# Patient Record
Sex: FEMALE | Race: BLACK OR AFRICAN AMERICAN | NOT HISPANIC OR LATINO | URBAN - METROPOLITAN AREA
[De-identification: names, ages, dates, MRNs, and addresses within clinical notes are randomized per-mention and may not be internally consistent; named-entity substitution may affect disease eponyms.]

---

## 2022-09-07 ENCOUNTER — INPATIENT (INPATIENT)
Facility: HOSPITAL | Age: 42
LOS: 0 days | Discharge: HOME | End: 2022-09-08
Attending: OBSTETRICS & GYNECOLOGY | Admitting: OBSTETRICS & GYNECOLOGY

## 2022-09-07 ENCOUNTER — RESULT REVIEW (OUTPATIENT)
Age: 42
End: 2022-09-07

## 2022-09-07 ENCOUNTER — TRANSCRIPTION ENCOUNTER (OUTPATIENT)
Age: 42
End: 2022-09-07

## 2022-09-07 VITALS
OXYGEN SATURATION: 100 % | TEMPERATURE: 98 F | RESPIRATION RATE: 16 BRPM | HEART RATE: 87 BPM | DIASTOLIC BLOOD PRESSURE: 82 MMHG | SYSTOLIC BLOOD PRESSURE: 116 MMHG

## 2022-09-07 DIAGNOSIS — O03.4 INCOMPLETE SPONTANEOUS ABORTION WITHOUT COMPLICATION: ICD-10-CM

## 2022-09-07 LAB
ALBUMIN SERPL ELPH-MCNC: 3.9 G/DL — SIGNIFICANT CHANGE UP (ref 3.3–5)
ALP SERPL-CCNC: 83 U/L — SIGNIFICANT CHANGE UP (ref 40–120)
ALT FLD-CCNC: 12 U/L — SIGNIFICANT CHANGE UP (ref 4–33)
ANION GAP SERPL CALC-SCNC: 10 MMOL/L — SIGNIFICANT CHANGE UP (ref 7–14)
APTT BLD: 31.4 SEC — SIGNIFICANT CHANGE UP (ref 27–36.3)
AST SERPL-CCNC: 15 U/L — SIGNIFICANT CHANGE UP (ref 4–32)
BASOPHILS # BLD AUTO: 0.04 K/UL — SIGNIFICANT CHANGE UP (ref 0–0.2)
BASOPHILS NFR BLD AUTO: 0.4 % — SIGNIFICANT CHANGE UP (ref 0–2)
BILIRUB SERPL-MCNC: <0.2 MG/DL — SIGNIFICANT CHANGE UP (ref 0.2–1.2)
BLD GP AB SCN SERPL QL: NEGATIVE — SIGNIFICANT CHANGE UP
BUN SERPL-MCNC: 12 MG/DL — SIGNIFICANT CHANGE UP (ref 7–23)
CALCIUM SERPL-MCNC: 9.1 MG/DL — SIGNIFICANT CHANGE UP (ref 8.4–10.5)
CHLORIDE SERPL-SCNC: 104 MMOL/L — SIGNIFICANT CHANGE UP (ref 98–107)
CO2 SERPL-SCNC: 25 MMOL/L — SIGNIFICANT CHANGE UP (ref 22–31)
CREAT SERPL-MCNC: 1 MG/DL — SIGNIFICANT CHANGE UP (ref 0.5–1.3)
EGFR: 73 ML/MIN/1.73M2 — SIGNIFICANT CHANGE UP
EOSINOPHIL # BLD AUTO: 0.2 K/UL — SIGNIFICANT CHANGE UP (ref 0–0.5)
EOSINOPHIL NFR BLD AUTO: 2 % — SIGNIFICANT CHANGE UP (ref 0–6)
GLUCOSE SERPL-MCNC: 99 MG/DL — SIGNIFICANT CHANGE UP (ref 70–99)
HCG SERPL-ACNC: 1199 MIU/ML — SIGNIFICANT CHANGE UP
HCT VFR BLD CALC: 30.2 % — LOW (ref 34.5–45)
HCT VFR BLD CALC: 30.8 % — LOW (ref 34.5–45)
HGB BLD-MCNC: 9.5 G/DL — LOW (ref 11.5–15.5)
HGB BLD-MCNC: 9.6 G/DL — LOW (ref 11.5–15.5)
IANC: 6.44 K/UL — SIGNIFICANT CHANGE UP (ref 1.8–7.4)
IMM GRANULOCYTES NFR BLD AUTO: 0.4 % — SIGNIFICANT CHANGE UP (ref 0–1.5)
INR BLD: 1.06 RATIO — SIGNIFICANT CHANGE UP (ref 0.88–1.16)
LYMPHOCYTES # BLD AUTO: 2.68 K/UL — SIGNIFICANT CHANGE UP (ref 1–3.3)
LYMPHOCYTES # BLD AUTO: 27.2 % — SIGNIFICANT CHANGE UP (ref 13–44)
MAGNESIUM SERPL-MCNC: 2 MG/DL — SIGNIFICANT CHANGE UP (ref 1.6–2.6)
MCHC RBC-ENTMCNC: 24.8 PG — LOW (ref 27–34)
MCHC RBC-ENTMCNC: 25.3 PG — LOW (ref 27–34)
MCHC RBC-ENTMCNC: 31.2 GM/DL — LOW (ref 32–36)
MCHC RBC-ENTMCNC: 31.5 GM/DL — LOW (ref 32–36)
MCV RBC AUTO: 79.6 FL — LOW (ref 80–100)
MCV RBC AUTO: 80.5 FL — SIGNIFICANT CHANGE UP (ref 80–100)
MONOCYTES # BLD AUTO: 0.46 K/UL — SIGNIFICANT CHANGE UP (ref 0–0.9)
MONOCYTES NFR BLD AUTO: 4.7 % — SIGNIFICANT CHANGE UP (ref 2–14)
NEUTROPHILS # BLD AUTO: 6.44 K/UL — SIGNIFICANT CHANGE UP (ref 1.8–7.4)
NEUTROPHILS NFR BLD AUTO: 65.3 % — SIGNIFICANT CHANGE UP (ref 43–77)
NRBC # BLD: 0 /100 WBCS — SIGNIFICANT CHANGE UP (ref 0–0)
NRBC # BLD: 0 /100 WBCS — SIGNIFICANT CHANGE UP (ref 0–0)
NRBC # FLD: 0 K/UL — SIGNIFICANT CHANGE UP (ref 0–0)
NRBC # FLD: 0 K/UL — SIGNIFICANT CHANGE UP (ref 0–0)
PLATELET # BLD AUTO: 437 K/UL — HIGH (ref 150–400)
PLATELET # BLD AUTO: 448 K/UL — HIGH (ref 150–400)
POTASSIUM SERPL-MCNC: 4.4 MMOL/L — SIGNIFICANT CHANGE UP (ref 3.5–5.3)
POTASSIUM SERPL-SCNC: 4.4 MMOL/L — SIGNIFICANT CHANGE UP (ref 3.5–5.3)
PROT SERPL-MCNC: 6.7 G/DL — SIGNIFICANT CHANGE UP (ref 6–8.3)
PROTHROM AB SERPL-ACNC: 12.3 SEC — SIGNIFICANT CHANGE UP (ref 10.5–13.4)
RBC # BLD: 3.75 M/UL — LOW (ref 3.8–5.2)
RBC # BLD: 3.87 M/UL — SIGNIFICANT CHANGE UP (ref 3.8–5.2)
RBC # FLD: 15.7 % — HIGH (ref 10.3–14.5)
RBC # FLD: 15.7 % — HIGH (ref 10.3–14.5)
RH IG SCN BLD-IMP: POSITIVE — SIGNIFICANT CHANGE UP
RH IG SCN BLD-IMP: POSITIVE — SIGNIFICANT CHANGE UP
SARS-COV-2 RNA SPEC QL NAA+PROBE: SIGNIFICANT CHANGE UP
SODIUM SERPL-SCNC: 139 MMOL/L — SIGNIFICANT CHANGE UP (ref 135–145)
WBC # BLD: 10.75 K/UL — HIGH (ref 3.8–10.5)
WBC # BLD: 9.86 K/UL — SIGNIFICANT CHANGE UP (ref 3.8–10.5)
WBC # FLD AUTO: 10.75 K/UL — HIGH (ref 3.8–10.5)
WBC # FLD AUTO: 9.86 K/UL — SIGNIFICANT CHANGE UP (ref 3.8–10.5)

## 2022-09-07 PROCEDURE — 99285 EMERGENCY DEPT VISIT HI MDM: CPT

## 2022-09-07 PROCEDURE — 76830 TRANSVAGINAL US NON-OB: CPT | Mod: 26

## 2022-09-07 RX ORDER — SODIUM CHLORIDE 9 MG/ML
1000 INJECTION, SOLUTION INTRAVENOUS
Refills: 0 | Status: DISCONTINUED | OUTPATIENT
Start: 2022-09-07 | End: 2022-09-08

## 2022-09-07 RX ADMIN — SODIUM CHLORIDE 125 MILLILITER(S): 9 INJECTION, SOLUTION INTRAVENOUS at 23:46

## 2022-09-07 NOTE — ED PROVIDER NOTE - CLINICAL SUMMARY MEDICAL DECISION MAKING FREE TEXT BOX
41 F  no PMH p/w worsening vaginal bleeding today (>15 pads) following >1 month of daily vaginal bleeding s/p miscarriage 3 weeks ago. Likely retained products of conception. TV US, OB cs, labs.

## 2022-09-07 NOTE — ED ADULT NURSE NOTE - OBJECTIVE STATEMENT
Received pt to [facility], A+Ox4, ambulatory. C/O worsening Vaginal bleeding over the last 3 days, , pt states she miscarried mid August, has bleeding ever since with clots, bleeding has gotten worse. Respirations even and unlabored, normal work of breathing, no accessory muscle use, speaking in full clear uninterrupted sentences. ABD is soft, tender in the lower quadrants, non distended.  Pt denies any chest pain, SOB, headache, dizziness, N/V/D, fever, chills. 18Gto LAC, Labs sent, will continue to monitor.

## 2022-09-07 NOTE — CONSULT NOTE ADULT - SUBJECTIVE AND OBJECTIVE BOX
ARMANI MCCABE  41y  Female [id number]    HPI:  40 y/o  LMP  presenting to ED with heavy vaginal bleeding for the past month.    States that she initially found out she was pregnant on  with a positive urine pregnancy test. Starting  she started to have very heavy bleeding, sometimes saturating through 10 pads a day, sometimes 3-5     Name of GYN Physician:     ObHx:  GynHx: Denies fibroids, cysts, endometriosis, STI's, Abnormal pap smears   PMHx:  SurgHx:  Meds:  Allergies:  Social History:  Denies smoking use, drug use, alcohol use.    Vital Signs Last 24 Hrs  T(C): 36.9 (07 Sep 2022 19:58), Max: 36.9 (07 Sep 2022 19:58)  T(F): 98.4 (07 Sep 2022 19:58), Max: 98.4 (07 Sep 2022 19:58)  HR: 89 (07 Sep 2022 19:58) (87 - 89)  BP: 106/81 (07 Sep 2022 19:58) (106/81 - 116/82)  BP(mean): --  RR: 18 (07 Sep 2022 19:58) (16 - 18)  SpO2: 100% (07 Sep 2022 19:58) (100% - 100%)    Parameters below as of 07 Sep 2022 19:58  Patient On (Oxygen Delivery Method): room air        Physical Exam:   General: sitting comfortably in bed, NAD   HEENT: neck supple, full ROM  CV: RR S1S2 no m/r/g  Lungs: CTA b/l, good air flow b/l   Back: No CVA tenderness  Abd: Soft, non-tender, non-distended.  Bowel sounds present.    :  No bleeding on pad. External labia wnl. Bimanual exam with no cervical motion tenderness, uterus wnl, adnexa non palpable b/l.  Cervix closed vs. Cervix dilated *** cm   Speculum Exam: No active bleeding from os. Physiologic discharge.    Ext: non-tender b/l, no edema     Chaperoned by     LABS:                              9.5    9.86  )-----------( 448      ( 07 Sep 2022 17:00 )             30.2     -    139  |  104  |  12  ----------------------------<  99  4.4   |  25  |  1.00    Ca    9.1      07 Sep 2022 17:00  Mg     2.00         TPro  6.7  /  Alb  3.9  /  TBili  <0.2  /  DBili  x   /  AST  15  /  ALT  12  /  AlkPhos  83      I&O's Detail    PT/INR - ( 07 Sep 2022 17:00 )   PT: 12.3 sec;   INR: 1.06 ratio         PTT - ( 07 Sep 2022 17:00 )  PTT:31.4 sec      RADIOLOGY & ADDITIONAL STUDIES: ARMANI MCCABE  41y  Female [id number]    HPI:  40 y/o  LMP  presenting to ED with heavy vaginal bleeding for the past month following known miscarriage.    States that she initially found out she was pregnant on  with a positive urine pregnancy test. Starting  she started to have very heavy bleeding, sometimes saturating through 10 pads a day, sometimes 3-5 on a lighter day. Also notes passage of large, fist-size clots multiple times a day. Endorses lightheadedness and dizziness since the start of the bleeding. Denies fevers, chills, abdominal pain, abnormal vaginal discharge. States that she presented to the [facility] ED a few times with the same complaint - initially was told she was having bleeding in pregnancy, then was told she was having a miscarriage and that they did not see anything in her uterus. Per EMR chart review on patient's phone, seen on  with an H/H of 11.7/35.9 and a [place] HCG of 632[address], FH noted at that time. Seen again  with H/H of 10.8/33.4 with HCG of 5338 and diagnosed with miscarriage on ultrasound. Saw her doctor today (Dr Aparicio?) who told her to come to the emergency department given her symptoms and bleeding.    At time of evaluation, patient endorsing lightheadedness and dizziness, moving comfortably in bed. Stating that she felt weak from the bleeding. Denied having any procedures done or any medications taken so far. At bedside, repeated vital signs with BPs in 100s/70s, non-tachycardic.    Name of GYN Physician: Dr Aparicio(?) associated / [facility]    ObHx: NSVDx5 (last )  GynHx: Denies fibroids, cysts, endometriosis, STI's, Abnormal pap smears   PMHx: denies  SurgHx: LSC ovarian cystectomy (, [place])  Meds: denies  Allergies: NKDA  Social History:  Denies smoking use, drug use, alcohol use.    Vital Signs Last 24 Hrs  T(C): 36.9 (07 Sep 2022 19:58), Max: 36.9 (07 Sep 2022 19:58)  T(F): 98.4 (07 Sep 2022 19:58), Max: 98.4 (07 Sep 2022 19:58)  HR: 89 (07 Sep 2022 19:58) (87 - 89)  BP: 106/81 (07 Sep 2022 19:58) (106/81 - 116/82)  BP(mean): --  RR: 18 (07 Sep 2022 19:58) (16 - 18)  SpO2: 100% (07 Sep 2022 19:58) (100% - 100%)    Parameters below as of 07 Sep 2022 19:58  Patient On (Oxygen Delivery Method): room air    Physical Exam:   General: laying comfortably in bed, NAD   CV: RR S1S2 no m/r/g  Lungs: CTA b/l, good air flow b/l   Back: No CVA tenderness  Abd: Soft, non-tender, non-distended. Bowel sounds present.    : Spotting on recently changed pad. External labia wnl. Bimanual exam with no cervical motion tenderness, uterus wnl, adnexa non palpable b/l.  Cervix closed.  Speculum Exam: No active bleeding from os. Small ~5cc dark blood clot in vault, minimal blood in vault. Physiologic discharge.    Ext: non-tender b/l, no edema     Chaperoned by Mayra Dupont    LABS:             9.5    9.86  )-----------( 448      ( 07 Sep 2022 17:00 )             30.2         139  |  104  |  12  ----------------------------<  99  4.4   |  25  |  1.00    Ca    9.1      07 Sep 2022 17:00  Mg     2.00         TPro  6.7  /  Alb  3.9  /  TBili  <0.2  /  DBili  x   /  AST  15  /  ALT  12  /  AlkPhos  83  -07    I&O's Detail    PT/INR - ( 07 Sep 2022 17:00 )   PT: 12.3 sec;   INR: 1.06 ratio         PTT - ( 07 Sep 2022 17:00 )  PTT:31.4 sec    RADIOLOGY & ADDITIONAL STUDIES:  < from: US Transvaginal (22 @ 17:54) >  ACC: [id number] EXAM:  US TRANSVAGINAL                          PROCEDURE DATE:  2022          INTERPRETATION:  CLINICAL INFORMATION: Vaginal bleeding. Assess for   retained products of conception.    LMP: 2022.    COMPARISON: None available.    TECHNIQUE:  Endovaginal pelvic sonogram only.    FINDINGS:  Uterus: 7.1 cm x 4.6 cm x 4.1 cm. Within normal limits.  Endometrium: 13 mm. Thickened with echogenic intraluminal contents and   increased vascularity with prominent vessel along the anterior   endometrium.    Right ovary: 2.6 cm x 1.0 cm x 1.3 cm inclusive of a corpus luteum.  Left ovary: Not visualized.    Bilobed cystic structure in the left adnexa with lobes measuring up to   1.3 and 1.7 cm respectively.    Fluid: None.    IMPRESSION:    Findings concerning for retained products of conception. Correlate with   serial trending beta hCGs.    --- End of Report ---      MARYAM SAPP MD; Resident Radiology  This document has been electronically signed.  SANDRA IBRAHIM MD; Attending Radiologist  This document has been electronically signed. Sep  7 2022  6:21PM    < end of copied text >

## 2022-09-07 NOTE — ED PROVIDER NOTE - PROGRESS NOTE DETAILS
[facility]:  pt agreed to have surgery.  GYN repaged and saw pt.  they will add pt on for OR tonight.  will admit to GYN service

## 2022-09-07 NOTE — ED PROVIDER NOTE - NS ED ROS FT
REVIEW OF SYSTEMS:  CONSTITUTIONAL: + weakness, no fevers or chills  EYES/ENT: No visual changes; No vertigo, throat pain, or dysphagia  NECK: No pain or stiffness  RESPIRATORY: No cough, wheezing, hemoptysis, or shortness of breath  CARDIOVASCULAR: +lightheadedness. No chest pain or palpitations  GASTROINTESTINAL: No abdominal or epigastric pain. No nausea, vomiting, or hematemesis; No diarrhea or constipation. No melena or hematochezia.  GENITOURINARY: +vaginal bleeding. No dysuria, frequency, or hematuria  MUSCULOSKELETAL: No joint or muscle pain or aches  NEUROLOGICAL: No numbness or weakness  SKIN: No itching or rashes

## 2022-09-07 NOTE — ED PROVIDER NOTE - PHYSICAL EXAMINATION
T(C): 36.8 (09-07-22 @ 15:46), Max: 36.8 (09-07-22 @ 15:46)  HR: 87 (09-07-22 @ 15:46) (87 - 87)  BP: 116/82 (09-07-22 @ 15:46) (116/82 - 116/82)  RR: 16 (09-07-22 @ 15:46) (16 - 16)  SpO2: 100% (09-07-22 @ 15:46) (100% - 100%)    CONSTITUTIONAL: Well groomed, no apparent distress  EYES: PERRL and symmetric, No conjunctival or scleral injection, non-icteric  ENMT: Oral mucosa with moist membranes. N  RESP: No respiratory distress, no use of accessory muscles; CTA b/l, no WRR  CV: RRR, +S1S2, no MRG; no JVD; no peripheral edema  GI: TTP in b/l lower quadrants. Soft, ND, no rebound  : +gross vaginal bleeding  SKIN: No rashes or ulcers noted; no subcutaneous nodules or induration palpable

## 2022-09-07 NOTE — ED PROVIDER NOTE - CARE PLAN
Assessment and plan of treatment:	41 F  no PMH p/w worsening vaginal bleeding today (>15 pads) following >1 month of daily vaginal bleeding s/p miscarriage 3 weeks ago. Likely retained products of conception.    Plan:  - Labs: CBC, CMP, HCG, coags, T&S, COVID, Rh labs  - US transvaginal  - OB cs  - Dispo: Poss admit   Principal Discharge DX:	Retained products of conception after miscarriage  Assessment and plan of treatment:	41 F  no PMH p/w worsening vaginal bleeding today (>15 pads) following >1 month of daily vaginal bleeding s/p miscarriage 3 weeks ago. Likely retained products of conception.    Plan:  - Labs: CBC, CMP, HCG, coags, T&S, COVID, Rh labs  - US transvaginal  - OB cs  - Dispo: Poss admit   1

## 2022-09-07 NOTE — ED PROVIDER NOTE - OBJECTIVE STATEMENT
41 F  no PMH p/w worsening vaginal bleeding today. Patient reports that the vaginal bleeding started on . Patient saw OB, Dr. Aparicio at [place], a two days later and pregnancy remained viable 41 F  no PMH p/w worsening vaginal bleeding today. Patient reports that the vaginal bleeding started on . Patient saw OB, Dr. Aparicio at [place], multiple times last month and was found to have a miscarriage. Patient denies any procedures since the . Bleeding has continued daily, but greatly increased today. Patient reports bleeding through about 15 pads since this afternoon. Patient endorses weakness, fatigue, but denies SOB, CP, N/V/D. 41 F  no PMH p/w worsening vaginal bleeding today. Patient reports that the vaginal bleeding started on . Patient saw OB, Dr. Aparicio at [facility], multiple times last month and was found to have a miscarriage. Patient denies any procedures since the . Bleeding has continued daily, but greatly increased today. Patient reports bleeding through about 15 pads since this afternoon. Patient endorses weakness, fatigue, but denies SOB, CP, N/V/D.    Attendinyo female  presents with vaginal bleeding since .  was diagnosed with miscarriage at end of july/early august.  since then has been bleeding daily.  has increased bleeding and weakness today.  no surgical history.  never had this happen before.

## 2022-09-07 NOTE — H&P ADULT - ASSESSMENT
42 y/o  LMP  presenting to ED with heavy vaginal bleeding in the setting of incomplete .    #Incomplete   - TVUS: uterus 7.1x4.6x4.1cm, endometrium 13mm, thickened with echogenic intraluminal contents and increased vascularity with prominent vessel along the anterior endometrium, findings concerning for retained products of conception  - Labs: H/H 9.5/30.2, T&S O+  - On exam, patient without active bleeding, scant bleeding on recently changed pad; however patient reporting passage of large clots while in the ED  - Pt initially declining D&C as documented in prior Gyn consult note  - Notified by ED attending pt was spoken to w/ [employer]  and desires to proceed with the procedure; spoke to patient at bedside to confirm and signed consents  - Added on to OR for urgent D&C  - Admit to Gyn for procedure    d/w service attending Dr Leo Lopez  PGY-2   42 y/o  LMP  presenting to ED with heavy vaginal bleeding in the setting of incomplete .    #Incomplete   - TVUS: uterus 7.1x4.6x4.1cm, endometrium 13mm, thickened with echogenic intraluminal contents and increased vascularity with prominent vessel along the anterior endometrium, findings concerning for retained products of conception  - Labs: H/H 9.5/30.2, T&S O+  - On exam, patient without active bleeding, scant bleeding on recently changed pad; however patient reporting passage of large clots while in the ED  - Pt initially declining D&C as documented in prior Gyn consult note  - Notified by ED attending pt was spoken to w/ [employer] [place] [employer]  and desires to proceed with the procedure; spoke to patient at bedside to confirm and signed consents  - Added on to OR for urgent D&C  - Admit to Gyn for procedure    d/w service attending Dr Leo Lopez  PGY-2    42 y/o  admitted for D&C for presumed retained POC's.  David Bailey M.D.

## 2022-09-07 NOTE — ED ADULT NURSE REASSESSMENT NOTE - NS ED NURSE REASSESS COMMENT FT1
report received from day shift RN. Pt A&Ox4 and ambulatory, c/o dizziness at this time. MD aware. abd is soft and nondistended, nontender upon palpation. pt reports no active bleeding at this time. VS as noted in flowsheet. respirations even and unlabored. skin intact. awaiting rpt lab results.

## 2022-09-07 NOTE — ED ADULT TRIAGE NOTE - CHIEF COMPLAINT QUOTE
Pt had miscarriage 3 weeks ago presents to ED for vaginal bleeding worsening over the past 3 days. Pt endorses dizziness, pt saturated over 15 pads this afternoon. Pt lethargic in triage endorses shortness of breath, denies any present pain.

## 2022-09-07 NOTE — H&P ADULT - HISTORY OF PRESENT ILLNESS
ARMANI MCCABE  41y  Female [id number]    HPI:  42 y/o  LMP  presenting to ED with heavy vaginal bleeding for the past month following known miscarriage.    States that she initially found out she was pregnant on  with a positive urine pregnancy test. Starting  she started to have very heavy bleeding, sometimes saturating through 10 pads a day, sometimes 3-5 on a lighter day. Also notes passage of large, fist-size clots multiple times a day. Endorses lightheadedness and dizziness since the start of the bleeding. Denies fevers, chills, abdominal pain, abnormal vaginal discharge. States that she presented to the [facility] ED a few times with the same complaint - initially was told she was having bleeding in pregnancy, then was told she was having a miscarriage and that they did not see anything in her uterus. Per EMR chart review on patient's phone, seen on  with an H/H of 11.7/35.9 and a beta HCG of 50928, FH noted at that time. Seen again  with H/H of 10.8/33.4 with HCG of 5338 and diagnosed with miscarriage on ultrasound. Saw her doctor today (Dr Aparicio?) who told her to come to the emergency department given her symptoms and bleeding.    At time of evaluation, patient endorsing lightheadedness and dizziness, moving comfortably in bed. Stating that she felt weak from the bleeding. Denied having any procedures done or any medications taken so far. At bedside, repeated vital signs with BPs in 100s/70s, non-tachycardic.    Name of GYN Physician: Dr Aparicio(?) associated / [facility]    ObHx: NSVDx5 (last )  GynHx: Denies fibroids, cysts, endometriosis, STI's, Abnormal pap smears   PMHx: denies  SurgHx: LSC ovarian cystectomy (, [place])  Meds: denies  Allergies: NKDA  Social History:  Denies smoking use, drug use, alcohol use.    Vital Signs Last 24 Hrs  T(C): 36.8 (07 Sep 2022 20:45), Max: 36.9 (07 Sep 2022 19:58)  T(F): 98.2 (07 Sep 2022 20:45), Max: 98.4 (07 Sep 2022 19:58)  HR: 80 (07 Sep 2022 20:45) (80 - 89)  BP: 100/69 (07 Sep 2022 20:45) (100/69 - 116/82)  BP(mean): --  RR: 16 (07 Sep 2022 20:45) (16 - 18)  SpO2: 100% (07 Sep 2022 20:45) (100% - 100%)    Parameters below as of 07 Sep 2022 20:45  Patient On (Oxygen Delivery Method): room air    Physical Exam:   General: laying comfortably in bed, NAD   CV: RR S1S2 no m/r/g  Lungs: CTA b/l, good air flow b/l   Back: No CVA tenderness  Abd: Soft, non-tender, non-distended. Bowel sounds present.    : Spotting on recently changed pad. External labia wnl. Bimanual exam with no cervical motion tenderness, uterus wnl, adnexa non palpable b/l.  Cervix closed.  Speculum Exam: No active bleeding from os. Small ~5cc dark blood clot in vault, minimal blood in vault. Physiologic discharge.    Ext: non-tender b/l, no edema     Chaperoned by Mayra Dupont    LABS:             9.6    10.75 )-----------( 437      ( 07 Sep 2022 21:50 )             30.8         139  |  104  |  12  ----------------------------<  99  4.4   |  25  |  1.00    Ca    9.1      07 Sep 2022 17:00  Mg     2.00         TPro  6.7  /  Alb  3.9  /  TBili  <0.2  /  DBili  x   /  AST  15  /  ALT  12  /  AlkPhos  83  -    I&O's Detail    PT/INR - ( 07 Sep 2022 17:00 )   PT: 12.3 sec;   INR: 1.06 ratio         PTT - ( 07 Sep 2022 17:00 )  PTT:31.4 sec    HCG Quantitative, Serum (22 @ 17:00)    HCG Quantitative, Serum: 1199.0: For pregnancy evaluation the reference values are as follows:  Negative: <5 mIU/mL  Indeterminate: 5-25 mIU/mL (suggest repeat testing in 72hrs)  Positive: >25 mIU/mL  Note: hCG results of false positive and false negative for pregnancy are  rare, but can occur with this, and other hCG tests. Beth- and  post-menopausal females may have mildly elevated hCG concentrations,  usually less than 14 mIU/mL, that are constant over time.  Weeks of pregnancy:           mIU/mL  ------------------         -------          3                                6 -      71          4                              10 -     750          5                             220 -   7,100          6                             160 -  32,000          7                3,700 - 164,000          8                          32,000 - 150,000          9                          64,000 - 151,000         10                         47,000 - 187,000         12                         28,000 - 211,000         14                         14,000 -  63,000         15                         12,000 -  71,000         16 - 18                   8,000 -  58,000 mIU/mL    RADIOLOGY & ADDITIONAL STUDIES:  < from: US Transvaginal (22 @ 17:54) >  ACC: [id number] EXAM:  US TRANSVAGINAL                          PROCEDURE DATE:  2022          INTERPRETATION:  CLINICAL INFORMATION: Vaginal bleeding. Assess for   retained products of conception.    LMP: 2022.    COMPARISON: None available.    TECHNIQUE:  Endovaginal pelvic sonogram only.    FINDINGS:  Uterus: 7.1 cm x 4.6 cm x 4.1 cm. Within normal limits.  Endometrium: 13 mm. Thickened with echogenic intraluminal contents and   increased vascularity with prominent vessel along the anterior   endometrium.    Right ovary: 2.6 cm x 1.0 cm x 1.3 cm inclusive of a corpus luteum.  Left ovary: Not visualized.    Bilobed cystic structure in the left adnexa with lobes measuring up to   1.3 and 1.7 cm respectively.    Fluid: None.    IMPRESSION:    Findings concerning for retained products of conception. Correlate with   serial trending beta hCGs.    --- End of Report ---      MARYAM SAPP MD; Resident Radiology  This document has been electronically signed.  SANDRA IBRAHIM MD; Attending Radiologist  This document has been electronically signed. Sep  7 2022  6:21PM    < end of copied text >

## 2022-09-07 NOTE — CONSULT NOTE ADULT - ASSESSMENT
42 y/o  LMP  presenting to ED with heavy vaginal bleeding in the setting of incomplete .    #Incomplete   - TVUS: uterus 7.1x4.6x4.1cm, endometrium 13mm, thickened with echogenic intraluminal contents and increased vascularity with prominent vessel along the anterior endometrium, findings concerning for retained products of conception  - Labs: H/H 9.5/30.2, T&S O+  - On exam, patient without active bleeding, scant bleeding on recently changed pad; however patient reporting passage of large clots while in the ED  - Counseled patient on dilation and curettage including risks and benefits of procedure; and offered patient the procedure for removal of possible retained products of conception in the setting of symptomatic heavy vaginal bleeding for >1 month; patient declined, stating that she is not comfortable going under general anesthesia for the procedure as she did not go under general anesthesia for her previous surgery in [facility], and that is uncomfortable with the risks of procedure, additionally because she had heard of other women having bad outcomes from the procedure  - Counseled patient on expected continued course of heavy bleeding of unknown length of time without procedure, possibility of infection of retained products and recommended procedure given symptoms and amount of bleeding and ultrasound findings; patient declined and signed refusal for treatment form for D&C. At time of initial consent for surgery, patient requested [employer] ; counseling repeated with [employer]  ID #[id number] and documented on refusal of treatment form.  - Recommend continued outpatient follow-up with her private OBGYN; discussed with patient and patient acknowledges understanding  - ED return precautions for heavy bleeding; >2 maxi pads in an hour, lightheadedness, dizziness, fevers/chills, abdominal pain  - Given refusal for procedure, no active bleeding on exam, and stable vital signs, no acute Gyn intervention  - Primary management per ED, otherwise stable from Gyn perspective for discharge    d/w service attending Dr Leo Lopez  PGY-2   42 y/o  LMP  presenting to ED with heavy vaginal bleeding in the setting of incomplete .    #Incomplete   - TVUS: uterus 7.1x4.6x4.1cm, endometrium 13mm, thickened with echogenic intraluminal contents and increased vascularity with prominent vessel along the anterior endometrium, findings concerning for retained products of conception  - Labs: H/H 9.5/30.2, T&S O+  - On exam, patient without active bleeding, scant bleeding on recently changed pad; however patient reporting passage of large clots while in the ED  - Counseled patient on dilation and curettage including risks and benefits of procedure; and offered patient the procedure for removal of possible retained products of conception in the setting of symptomatic heavy vaginal bleeding for >1 month; patient declined, stating that she is not comfortable going under general anesthesia for the procedure as she did not go under general anesthesia for her previous surgery in [facility], and that is uncomfortable with the risks of procedure, additionally because she had heard of other women having bad outcomes from the procedure  - Counseled patient on expected continued course of heavy bleeding of unknown length of time without procedure, possibility of infection of retained products and recommended procedure given symptoms and amount of bleeding and ultrasound findings; patient declined and signed refusal for treatment form for D&C. At time of initial consent for surgery, patient requested [email or web address] ; counseling repeated with [employer]  ID #[id number] and documented on refusal of treatment form.  - Recommend continued outpatient follow-up with her private OBGYN; discussed with patient and patient acknowledges understanding  - ED return precautions for heavy bleeding; >2 maxi pads in an hour, lightheadedness, dizziness, fevers/chills, abdominal pain  - Given refusal for procedure, no active bleeding on exam, and stable vital signs, no acute Gyn intervention  - Primary management per ED, otherwise stable from Gyn perspective for discharge    d/w service attending Dr Leo Lopez  PGY-2    42 y/o  LMP  c/o heavy vaginal bleeding in the setting of recent SAB. TVUS suggests retained POC's advised D&C but patient refused.  To f/u with Gyn as an outpatient.  David Bailey M.D.

## 2022-09-07 NOTE — ED PROVIDER NOTE - PLAN OF CARE
41 F  no PMH p/w worsening vaginal bleeding today (>15 pads) following >1 month of daily vaginal bleeding s/p miscarriage 3 weeks ago. Likely retained products of conception.    Plan:  - Labs: CBC, CMP, HCG, coags, T&S, COVID, Rh labs  - US transvaginal  - OB cs  - Dispo: Poss admit

## 2022-09-08 VITALS
HEART RATE: 81 BPM | RESPIRATION RATE: 15 BRPM | OXYGEN SATURATION: 100 % | TEMPERATURE: 98 F | SYSTOLIC BLOOD PRESSURE: 87 MMHG | DIASTOLIC BLOOD PRESSURE: 60 MMHG

## 2022-09-08 PROCEDURE — 88305 TISSUE EXAM BY PATHOLOGIST: CPT | Mod: 26

## 2022-09-08 PROCEDURE — 71046 X-RAY EXAM CHEST 2 VIEWS: CPT | Mod: 26

## 2022-09-08 PROCEDURE — 59812 TREATMENT OF MISCARRIAGE: CPT

## 2022-09-08 RX ORDER — ONDANSETRON 8 MG/1
4 TABLET, FILM COATED ORAL ONCE
Refills: 0 | Status: DISCONTINUED | OUTPATIENT
Start: 2022-09-08 | End: 2022-09-08

## 2022-09-08 RX ORDER — IBUPROFEN 200 MG
1 TABLET ORAL
Qty: 0 | Refills: 0 | DISCHARGE
Start: 2022-09-08

## 2022-09-08 RX ORDER — ACETAMINOPHEN 500 MG
3 TABLET ORAL
Qty: 0 | Refills: 0 | DISCHARGE
Start: 2022-09-08

## 2022-09-08 RX ORDER — IBUPROFEN 200 MG
600 TABLET ORAL EVERY 6 HOURS
Refills: 0 | Status: DISCONTINUED | OUTPATIENT
Start: 2022-09-08 | End: 2022-09-08

## 2022-09-08 RX ORDER — ACETAMINOPHEN 500 MG
975 TABLET ORAL EVERY 6 HOURS
Refills: 0 | Status: DISCONTINUED | OUTPATIENT
Start: 2022-09-08 | End: 2022-09-08

## 2022-09-08 RX ORDER — FENTANYL CITRATE 50 UG/ML
50 INJECTION INTRAVENOUS
Refills: 0 | Status: DISCONTINUED | OUTPATIENT
Start: 2022-09-08 | End: 2022-09-08

## 2022-09-08 RX ORDER — FENTANYL CITRATE 50 UG/ML
25 INJECTION INTRAVENOUS
Refills: 0 | Status: DISCONTINUED | OUTPATIENT
Start: 2022-09-08 | End: 2022-09-08

## 2022-09-08 RX ADMIN — FENTANYL CITRATE 25 MICROGRAM(S): 50 INJECTION INTRAVENOUS at 02:00

## 2022-09-08 RX ADMIN — FENTANYL CITRATE 25 MICROGRAM(S): 50 INJECTION INTRAVENOUS at 02:15

## 2022-09-08 NOTE — ASU DISCHARGE PLAN (ADULT/PEDIATRIC) - CARE PROVIDER_API CALL
AKIKO REMY,   Oncology [facility], [facility]  [phone] [place] [address]  Phone: [phone]  Fax: (   )    -  Follow Up Time: 2 weeks

## 2022-09-08 NOTE — ASU DISCHARGE PLAN (ADULT/PEDIATRIC) - PROVIDER TOKENS
FREE:[LAST:[AKIKO REMY],PHONE:[phone]],FAX:[(   )    -],ADDRESS:[[facility], [place] [address]],FOLLOWUP:[2 weeks]]

## 2022-09-08 NOTE — BRIEF OPERATIVE NOTE - NSICDXBRIEFPREOP_GEN_ALL_CORE_FT
PRE-OP DIAGNOSIS:  Retained products of conception after miscarriage 08-Sep-2022 01:34:39  Lili Lopez

## 2022-09-08 NOTE — BRIEF OPERATIVE NOTE - OPERATION/FINDINGS
EUA revealed anteverted uterus, dilation and suction curettage with minimal curettings evacuated EUA revealed anteverted uterus, dilation and suction curettage (with #10 curette) with minimal curettings evacuated

## 2022-09-08 NOTE — ASU DISCHARGE PLAN (ADULT/PEDIATRIC) - NURSING INSTRUCTIONS
You received IV Tylenol for pain management at 1:30AM. Please DO NOT take any Tylenol (Acetaminophen) containing products, such as Vicodin, Percocet, Excedrin, and cold medications for the next 6 hours (until 7:30AM). DO NOT TAKE MORE THAN 3000 MG OF TYLENOL in a 24 hour period. You received IV Toradol for pain management at 1:15AM. Please DO NOT take Motrin/Ibuprofen/Advil/Aleve/NSAIDs (Non-Steroidal Anti-Inflammatory Drugs) for the next 6 hours (until 7:15AM).

## 2022-09-08 NOTE — BRIEF OPERATIVE NOTE - COMMENTS
Attending Attestation:  I was present with resident during the performance of the critical or key portion(s) of the services rendered and was directly involved in the management of the patient. I discussed the case with the resident and agree with the findings and plan as documented in the resident’s note.

## 2022-09-08 NOTE — BRIEF OPERATIVE NOTE - NSICDXBRIEFPOSTOP_GEN_ALL_CORE_FT
POST-OP DIAGNOSIS:  Retained products of conception after miscarriage 08-Sep-2022 01:34:52  Lili Lopez

## 2022-09-08 NOTE — ASU DISCHARGE PLAN (ADULT/PEDIATRIC) - ASU DC SPECIAL INSTRUCTIONSFT
After your surgery it is normal to experience:  •	Vaginal bleeding- can last 1-2 weeks and should not be heavier than a period. It may come and go and be red, brown or pink. Use pads, not tampons.  •	Cramping- Is common especially within the first 24 hours. This should be relieved by taking over the counter motrin, advil or Tylenol.  •	Watery discharge- can be seen for a day or two after hysteroscopy because some of the fluid that is put into the uterus during surgery may continue to leak out for a day or two.  •	Vaginal soreness/irritation- can occur in the first few days after surgery because of the instruments that were used in the vagina. Soreness can be treated with ice pack and irritation can be taken care of with an emollient such as balmex or aquaphor that you can put directly on the irritated area.    Restrictions: For 10-14 days after the surgery you should avoid the following:  •	Tampons  •	Sex  •	Vigorous gym exercise  •	Swimming  pools and tub baths  •	Wait a day or two before going back to work    Anesthesia Precautions:  For the next 12 hours do not:   •	drive a car,  •	 operate power tools or machinery,  •	drink alcohol, beer, or wine,   •	make important personal or business decisions    Diet:   •	Resume Regular diet but Progress diet slowly     Pain Medication:  •	Take over the counter pain medication  •	Tylenol 975 mg every 6 hours, and Motrin 800 mg every 6 hours, alternating (Tylenol, three hours later Motrin, three hours later Tylenol)    Physician Notification- Warning signs to look out for  •	Heavy Vaginal Bleeding   •	Shortness of breath or chest pain  •	Severe Abdominal Pain  •	Persistent nausea and vomiting  •	Pain not relieved by medications  •	Fever greater than 100.5®F  •	Inability to tolerate liquids or foods  •	Unable to urinate after 8 hours

## 2022-09-08 NOTE — BRIEF OPERATIVE NOTE - NSICDXBRIEFPROCEDURE_GEN_ALL_CORE_FT
PROCEDURES:  Dilation and evacuation, uterus, using suction curettage 08-Sep-2022 01:32:10  Lili Lopez

## 2022-09-08 NOTE — ASU DISCHARGE PLAN (ADULT/PEDIATRIC) - NS MD DC FALL RISK RISK
For information on Fall & Injury Prevention, visit: [email or web address] OR  [email or web address] OR  https://www.cdc.gov/steadi/patient.html

## 2022-09-12 PROBLEM — O03.9 COMPLETE OR UNSPECIFIED SPONTANEOUS ABORTION WITHOUT COMPLICATION: Chronic | Status: ACTIVE | Noted: 2022-09-07

## 2022-09-13 LAB — SURGICAL PATHOLOGY STUDY: SIGNIFICANT CHANGE UP

## 2022-09-21 ENCOUNTER — APPOINTMENT (OUTPATIENT)
Dept: OBGYN | Facility: HOSPITAL | Age: 42
End: 2022-09-21

## 2022-10-05 ENCOUNTER — NON-APPOINTMENT (OUTPATIENT)
Age: 42
End: 2022-10-05

## 2022-10-05 ENCOUNTER — APPOINTMENT (OUTPATIENT)
Dept: OBGYN | Facility: HOSPITAL | Age: 42
End: 2022-10-05

## 2022-10-12 ENCOUNTER — APPOINTMENT (OUTPATIENT)
Dept: OBGYN | Facility: HOSPITAL | Age: 42
End: 2022-10-12

## 2022-10-12 ENCOUNTER — RESULT REVIEW (OUTPATIENT)
Age: 42
End: 2022-10-12

## 2022-10-12 ENCOUNTER — OUTPATIENT (OUTPATIENT)
Dept: OUTPATIENT SERVICES | Facility: HOSPITAL | Age: 42
LOS: 1 days | End: 2022-10-12

## 2022-10-12 VITALS
DIASTOLIC BLOOD PRESSURE: 65 MMHG | WEIGHT: 205 LBS | SYSTOLIC BLOOD PRESSURE: 125 MMHG | TEMPERATURE: 98.1 F | HEART RATE: 78 BPM | BODY MASS INDEX: 30.36 KG/M2 | HEIGHT: 69 IN

## 2022-10-12 DIAGNOSIS — Z09 ENCOUNTER FOR FOLLOW-UP EXAMINATION AFTER COMPLETED TREATMENT FOR CONDITIONS OTHER THAN MALIGNANT NEOPLASM: ICD-10-CM

## 2022-10-12 LAB
BASOPHILS # BLD AUTO: 0.05 K/UL — SIGNIFICANT CHANGE UP (ref 0–0.2)
BASOPHILS NFR BLD AUTO: 0.7 % — SIGNIFICANT CHANGE UP (ref 0–2)
EOSINOPHIL # BLD AUTO: 0.15 K/UL — SIGNIFICANT CHANGE UP (ref 0–0.5)
EOSINOPHIL NFR BLD AUTO: 2 % — SIGNIFICANT CHANGE UP (ref 0–6)
HCT VFR BLD CALC: 33.1 % — LOW (ref 34.5–45)
HGB BLD-MCNC: 9.9 G/DL — LOW (ref 11.5–15.5)
IANC: 4.43 K/UL — SIGNIFICANT CHANGE UP (ref 1.8–7.4)
IMM GRANULOCYTES NFR BLD AUTO: 0.1 % — SIGNIFICANT CHANGE UP (ref 0–0.9)
LYMPHOCYTES # BLD AUTO: 2.17 K/UL — SIGNIFICANT CHANGE UP (ref 1–3.3)
LYMPHOCYTES # BLD AUTO: 29.3 % — SIGNIFICANT CHANGE UP (ref 13–44)
MCHC RBC-ENTMCNC: 23.9 PG — LOW (ref 27–34)
MCHC RBC-ENTMCNC: 29.9 GM/DL — LOW (ref 32–36)
MCV RBC AUTO: 79.8 FL — LOW (ref 80–100)
MONOCYTES # BLD AUTO: 0.59 K/UL — SIGNIFICANT CHANGE UP (ref 0–0.9)
MONOCYTES NFR BLD AUTO: 8 % — SIGNIFICANT CHANGE UP (ref 2–14)
NEUTROPHILS # BLD AUTO: 4.43 K/UL — SIGNIFICANT CHANGE UP (ref 1.8–7.4)
NEUTROPHILS NFR BLD AUTO: 59.9 % — SIGNIFICANT CHANGE UP (ref 43–77)
NRBC # BLD: 0 /100 WBCS — SIGNIFICANT CHANGE UP (ref 0–0)
NRBC # FLD: 0 K/UL — SIGNIFICANT CHANGE UP (ref 0–0)
PLATELET # BLD AUTO: 516 K/UL — HIGH (ref 150–400)
RBC # BLD: 4.15 M/UL — SIGNIFICANT CHANGE UP (ref 3.8–5.2)
RBC # FLD: 15.2 % — HIGH (ref 10.3–14.5)
WBC # BLD: 7.4 K/UL — SIGNIFICANT CHANGE UP (ref 3.8–10.5)
WBC # FLD AUTO: 7.4 K/UL — SIGNIFICANT CHANGE UP (ref 3.8–10.5)

## 2022-10-12 PROCEDURE — 99024 POSTOP FOLLOW-UP VISIT: CPT

## 2022-10-12 RX ORDER — CHLORHEXIDINE GLUCONATE 4 %
325 (65 FE) LIQUID (ML) TOPICAL DAILY
Qty: 1 | Refills: 3 | Status: ACTIVE | COMMUNITY
Start: 2022-10-12 | End: 1900-01-01

## 2022-10-13 DIAGNOSIS — Z09 ENCOUNTER FOR FOLLOW-UP EXAMINATION AFTER COMPLETED TREATMENT FOR CONDITIONS OTHER THAN MALIGNANT NEOPLASM: ICD-10-CM

## 2022-10-27 NOTE — ASSESSMENT
[Doing Well] : is doing well [Excellent Pain Control] : has excellent pain control [No Sign of Infection] : is showing no signs of infection [de-identified] : 40yo  POD#34 LMP -10/11 s/p DVC for retained POC after incomplete  with symptoms of anemia at this time and thin endometrial stripe on TVUS. \par

## 2022-10-27 NOTE — PLAN
[None] : None [FreeTextEntry1] : - VS wnl with no acute complaints\par - TVUS with thin endometrial stripe and no flow on doppler\par - Counseled on all birth control options and patient desires condoms at this time\par - Recommend iron rich diet and iron supplementation (Rx sent to pharmacy)\par - CBC sent today \par - RTC for annual apt \par \par seen and d/w Dr. Lim\par d/w Dr. Brandon \par Xochitl Rangel PGY3

## 2022-10-27 NOTE — HISTORY OF PRESENT ILLNESS
[Pain is well-controlled] : pain is well-controlled [Pathology reviewed] : pathology reviewed [Fever] : no fever [Chills] : no chills [Nausea] : no nausea [Vomiting] : no vomiting [Diarrhea] : no diarrhea [Vaginal Bleeding] : no vaginal bleeding [Pelvic Pressure] : no pelvic pressure [Dysuria] : no dysuria [Vaginal Discharge] : no vaginal discharge [Constipation] : no constipation [de-identified] : 35 [de-identified] : Dilation vacuum curettage for retained POC [de-identified] : 40yo  POD#34 s/p DVC for retained POC after incomplete . She reports that she has been lightheaded upon waking up everyday with associated headaches that ultimately resolves throughout the day with Tylenol. She has been bleeding heavily from  - 10/11 using 4-5 pads per day. Otherwise patient denies all complaints. Denies fevers, CP, SOB, abdominal pain, N.V, vaginal discharge, dysuria and edema. \par \par GYN: Ovarian cyst\par OB:   x5\par PMH: Denies\par PSH: Lsc ovarian cystectomy (), DVC\par All: NKDA\par Meds: Denies  [de-identified] : Gen NAD, Abd soft, nontender

## 2022-12-20 NOTE — ED ADULT TRIAGE NOTE - NS ED NURSE AMBULANCES
[facility] Opioid Counseling: I discussed with the patient the potential side effects of opioids including but not limited to addiction, altered mental status, and depression. I stressed avoiding alcohol, benzodiazepines, muscle relaxants and sleep aids unless specifically okayed by a physician. The patient verbalized understanding of the proper use and possible adverse effects of opioids. All of the patient's questions and concerns were addressed. They were instructed to flush the remaining pills down the toilet if they did not need them for pain.

## 2023-01-10 ENCOUNTER — APPOINTMENT (OUTPATIENT)
Dept: OBGYN | Facility: HOSPITAL | Age: 43
End: 2023-01-10

## 2023-01-10 ENCOUNTER — OUTPATIENT (OUTPATIENT)
Dept: OUTPATIENT SERVICES | Facility: HOSPITAL | Age: 43
LOS: 1 days | End: 2023-01-10

## 2023-01-10 ENCOUNTER — RESULT CHARGE (OUTPATIENT)
Age: 43
End: 2023-01-10

## 2023-01-11 ENCOUNTER — APPOINTMENT (OUTPATIENT)
Dept: OBGYN | Facility: HOSPITAL | Age: 43
End: 2023-01-11

## 2023-01-12 DIAGNOSIS — Z32.00 ENCOUNTER FOR PREGNANCY TEST, RESULT UNKNOWN: ICD-10-CM

## 2023-01-19 ENCOUNTER — EMERGENCY (EMERGENCY)
Facility: HOSPITAL | Age: 43
LOS: 1 days | Discharge: HOME | End: 2023-01-19
Attending: EMERGENCY MEDICINE | Admitting: EMERGENCY MEDICINE
Payer: SELF-PAY

## 2023-01-19 VITALS
HEART RATE: 81 BPM | RESPIRATION RATE: 17 BRPM | OXYGEN SATURATION: 98 % | SYSTOLIC BLOOD PRESSURE: 107 MMHG | DIASTOLIC BLOOD PRESSURE: 56 MMHG | TEMPERATURE: 99 F

## 2023-01-19 LAB
ALBUMIN SERPL ELPH-MCNC: 4.3 G/DL — SIGNIFICANT CHANGE UP (ref 3.3–5)
ALP SERPL-CCNC: 95 U/L — SIGNIFICANT CHANGE UP (ref 40–120)
ALT FLD-CCNC: 16 U/L — SIGNIFICANT CHANGE UP (ref 4–33)
ANION GAP SERPL CALC-SCNC: 10 MMOL/L — SIGNIFICANT CHANGE UP (ref 7–14)
AST SERPL-CCNC: 16 U/L — SIGNIFICANT CHANGE UP (ref 4–32)
BASOPHILS # BLD AUTO: 0.06 K/UL — SIGNIFICANT CHANGE UP (ref 0–0.2)
BASOPHILS NFR BLD AUTO: 0.7 % — SIGNIFICANT CHANGE UP (ref 0–2)
BILIRUB SERPL-MCNC: <0.2 MG/DL — SIGNIFICANT CHANGE UP (ref 0.2–1.2)
BLD GP AB SCN SERPL QL: NEGATIVE — SIGNIFICANT CHANGE UP
BUN SERPL-MCNC: 12 MG/DL — SIGNIFICANT CHANGE UP (ref 7–23)
CALCIUM SERPL-MCNC: 9.6 MG/DL — SIGNIFICANT CHANGE UP (ref 8.4–10.5)
CHLORIDE SERPL-SCNC: 103 MMOL/L — SIGNIFICANT CHANGE UP (ref 98–107)
CO2 SERPL-SCNC: 24 MMOL/L — SIGNIFICANT CHANGE UP (ref 22–31)
CREAT SERPL-MCNC: 0.78 MG/DL — SIGNIFICANT CHANGE UP (ref 0.5–1.3)
EGFR: 98 ML/MIN/1.73M2 — SIGNIFICANT CHANGE UP
EOSINOPHIL # BLD AUTO: 0.32 K/UL — SIGNIFICANT CHANGE UP (ref 0–0.5)
EOSINOPHIL NFR BLD AUTO: 3.7 % — SIGNIFICANT CHANGE UP (ref 0–6)
GLUCOSE SERPL-MCNC: 116 MG/DL — HIGH (ref 70–99)
HCG SERPL-ACNC: 7658 MIU/ML — SIGNIFICANT CHANGE UP
HCT VFR BLD CALC: 36.9 % — SIGNIFICANT CHANGE UP (ref 34.5–45)
HGB BLD-MCNC: 11.5 G/DL — SIGNIFICANT CHANGE UP (ref 11.5–15.5)
IANC: 4.94 K/UL — SIGNIFICANT CHANGE UP (ref 1.8–7.4)
IMM GRANULOCYTES NFR BLD AUTO: 0.2 % — SIGNIFICANT CHANGE UP (ref 0–0.9)
LYMPHOCYTES # BLD AUTO: 2.97 K/UL — SIGNIFICANT CHANGE UP (ref 1–3.3)
LYMPHOCYTES # BLD AUTO: 33.9 % — SIGNIFICANT CHANGE UP (ref 13–44)
MCHC RBC-ENTMCNC: 22.2 PG — LOW (ref 27–34)
MCHC RBC-ENTMCNC: 31.2 GM/DL — LOW (ref 32–36)
MCV RBC AUTO: 71.4 FL — LOW (ref 80–100)
MONOCYTES # BLD AUTO: 0.45 K/UL — SIGNIFICANT CHANGE UP (ref 0–0.9)
MONOCYTES NFR BLD AUTO: 5.1 % — SIGNIFICANT CHANGE UP (ref 2–14)
NEUTROPHILS # BLD AUTO: 4.94 K/UL — SIGNIFICANT CHANGE UP (ref 1.8–7.4)
NEUTROPHILS NFR BLD AUTO: 56.4 % — SIGNIFICANT CHANGE UP (ref 43–77)
NRBC # BLD: 0 /100 WBCS — SIGNIFICANT CHANGE UP (ref 0–0)
NRBC # FLD: 0 K/UL — SIGNIFICANT CHANGE UP (ref 0–0)
PLATELET # BLD AUTO: 445 K/UL — HIGH (ref 150–400)
POTASSIUM SERPL-MCNC: 4.1 MMOL/L — SIGNIFICANT CHANGE UP (ref 3.5–5.3)
POTASSIUM SERPL-SCNC: 4.1 MMOL/L — SIGNIFICANT CHANGE UP (ref 3.5–5.3)
PROT SERPL-MCNC: 7.7 G/DL — SIGNIFICANT CHANGE UP (ref 6–8.3)
RBC # BLD: 5.17 M/UL — SIGNIFICANT CHANGE UP (ref 3.8–5.2)
RBC # FLD: 18.8 % — HIGH (ref 10.3–14.5)
RH IG SCN BLD-IMP: POSITIVE — SIGNIFICANT CHANGE UP
SODIUM SERPL-SCNC: 137 MMOL/L — SIGNIFICANT CHANGE UP (ref 135–145)
WBC # BLD: 8.76 K/UL — SIGNIFICANT CHANGE UP (ref 3.8–10.5)
WBC # FLD AUTO: 8.76 K/UL — SIGNIFICANT CHANGE UP (ref 3.8–10.5)

## 2023-01-19 PROCEDURE — 99284 EMERGENCY DEPT VISIT MOD MDM: CPT

## 2023-01-19 PROCEDURE — 76817 TRANSVAGINAL US OBSTETRIC: CPT | Mod: 26

## 2023-01-19 NOTE — ED PROVIDER NOTE - CLINICAL SUMMARY MEDICAL DECISION MAKING FREE TEXT BOX
42-year-old female approximately 7-1/2 weeks gestation with complaints of left lower quadrant pain and vaginal spotting.  Differential diagnosis includes ectopic pregnancy, threatened AB, spontaneous AB, fibroids, ovarian cyst less likely ovarian torsion.  Will check labs and ultrasound.  Will consult OB if no IUP is noted.

## 2023-01-19 NOTE — ED PROVIDER NOTE - OBJECTIVE STATEMENT
42-year-old female G8, P5 with LMP November 26.  Patient presents to ED with with few hours of vaginal spotting and left-sided abdominal cramping.  Patient is not using a pad yet and cramping is intermittent and very mild.  Patient denies any urinary symptoms, nausea vomiting, fevers or chills, back pain.  Patient has history of 2 prior miscarriages.  Patient has follow-up with OB in our clinic on February 2.

## 2023-01-19 NOTE — ED ADULT NURSE NOTE - OBJECTIVE STATEMENT
Received patient in Intake 2 c/o vaginal bleeding, abdominal cramping, hx of 7 weeks pregnant. Patient denies SOB, chest pain. Patient is A&OX4, airway patent, breathing unlabored and even, radial pulses palpable. Side rails up and safety maintained. Fall precaution in place.

## 2023-01-19 NOTE — ED ADULT NURSE NOTE - NSIMPLEMENTINTERV_GEN_ALL_ED
Implemented All Fall with Harm Risk Interventions:  [place] to call system. Call bell, personal items and telephone within reach. Instruct patient to call for assistance. Room bathroom lighting operational. Non-slip footwear when patient is off stretcher. Physically safe environment: no spills, clutter or unnecessary equipment. Stretcher in lowest position, wheels locked, appropriate side rails in place. Provide visual cue, wrist band, yellow gown, etc. Monitor gait and stability. Monitor for mental status changes and reorient to person, place, and time. Review medications for side effects contributing to fall risk. Reinforce activity limits and safety measures with patient and family. Provide visual clues: red socks.

## 2023-01-19 NOTE — ED PROVIDER NOTE - PATIENT PORTAL LINK FT
You can access the FollowMyHealth Patient Portal offered by [facility] by registering at the following website: [email or web address]. By joining [facility]’s FollowMyHealth portal, you will also be able to view your health information using other applications (apps) compatible with our system.

## 2023-01-19 NOTE — ED PROVIDER NOTE - NSFOLLOWUPINSTRUCTIONS_ED_ALL_ED_FT
You are leaving prior to the results of your ultrasound report.  Please return to the emergency department if you have any heavy vaginal bleeding, worsening abdominal pain, nausea vomiting or any other concerns.  Please follow-up with the OB in the next 48 hours.

## 2023-01-24 ENCOUNTER — OUTPATIENT (OUTPATIENT)
Dept: OUTPATIENT SERVICES | Facility: HOSPITAL | Age: 43
LOS: 1 days | End: 2023-01-24

## 2023-01-24 ENCOUNTER — APPOINTMENT (OUTPATIENT)
Dept: OBGYN | Facility: HOSPITAL | Age: 43
End: 2023-01-24
Payer: MEDICAID

## 2023-01-24 VITALS
TEMPERATURE: 97.8 F | SYSTOLIC BLOOD PRESSURE: 122 MMHG | HEART RATE: 79 BPM | WEIGHT: 202 LBS | HEIGHT: 69 IN | BODY MASS INDEX: 29.92 KG/M2 | DIASTOLIC BLOOD PRESSURE: 84 MMHG

## 2023-01-24 DIAGNOSIS — N97.9 FEMALE INFERTILITY, UNSPECIFIED: ICD-10-CM

## 2023-01-24 DIAGNOSIS — O03.9 COMPLETE OR UNSPECIFIED SPONTANEOUS ABORTION W/OUT COMPLICATION: ICD-10-CM

## 2023-01-24 PROCEDURE — 76830 TRANSVAGINAL US NON-OB: CPT | Mod: 26

## 2023-01-24 PROCEDURE — 99213 OFFICE O/P EST LOW 20 MIN: CPT | Mod: GC,25

## 2023-01-25 DIAGNOSIS — O03.9 COMPLETE OR UNSPECIFIED SPONTANEOUS ABORTION WITHOUT COMPLICATION: ICD-10-CM

## 2023-01-30 NOTE — PHYSICAL EXAM
[Chaperone Present] : A chaperone was present in the examining room during all aspects of the physical examination [Appropriately responsive] : appropriately responsive [Alert] : alert [No Acute Distress] : no acute distress [No Lymphadenopathy] : no lymphadenopathy [Soft] : soft [Non-tender] : non-tender [Oriented x3] : oriented x3 [Labia Majora] : normal [Moderate] : There was moderate vaginal bleeding [Normal] : normal [FreeTextEntry5] : no tissue seen at os, os visually closed

## 2023-01-30 NOTE — HISTORY OF PRESENT ILLNESS
[FreeTextEntry1] : Fawn is a  LMP 23 complaining of vaginal bleeding. Patient was seen in the ED on 23 for vaginal bleeding and at that time had a SLIUP on TVUS. Per the patient, her bleeding resolved but then started again today. She has been changing her pad frequently for hygienic purposes (not fully saturated) and has passed one clot today. Denies cramping, fevers, chills.\par \par OB Hx: \par NSVDx4, SABx3\par Children born , , , . Pelvis proven to 8 pounds. First 2 children born in [facility], second two born at [facility]\par \par GYN Hx: history of ovarian cysts on L side s/p cystectomy and detorsion in \par \par PMH: denies\par PSH: 2006 open L ovarian cystectomy & detorsion\par medicines: none. not taking PNV\par allergies: none\par \par Lab: O+

## 2023-01-30 NOTE — PLAN
[FreeTextEntry1] : Fawn is a  LMP 23 presenting with vaginal bleeding found to have SAB. Because patient desires fertility and has experienced multiple losses (SABx4), she would like a referral to [facility]. Patient counseled that she should expect to bleed after this miscarriage for about 2 weeks. She is aware that she should wait until she has experienced a normal period before she starts trying to conceive again. The importance of prenatal vitamins for baby's neurological development discussed with patient.\par \par - [facility] referral given to patient \par - prenatal vitamins given to patient \par - repeat TVUS in 6 months prior to f/u to follow ovarian cysts\par - RTC in 6 mo\par \par Patient seen and evaluated with Dr. Desai and Dr. Khan\par \par Maria Root PGY1

## 2023-01-30 NOTE — PROCEDURE
[Transvaginal OB Sonogram] : Transvaginal OB Sonogram [Intrauterine Pregnancy] : no intrauterine pregnancy [Yolk Sac] : no yolk sac [Fetal Heart] : no fetal heart [FreeTextEntry1] : s/p spontaneous

## 2023-01-31 ENCOUNTER — APPOINTMENT (OUTPATIENT)
Dept: ULTRASOUND IMAGING | Facility: CLINIC | Age: 43
End: 2023-01-31

## 2023-02-02 ENCOUNTER — APPOINTMENT (OUTPATIENT)
Dept: OBGYN | Facility: HOSPITAL | Age: 43
End: 2023-02-02

## 2023-02-10 ENCOUNTER — NON-APPOINTMENT (OUTPATIENT)
Age: 43
End: 2023-02-10

## 2023-02-10 ENCOUNTER — APPOINTMENT (OUTPATIENT)
Dept: OBGYN | Facility: CLINIC | Age: 43
End: 2023-02-10
Payer: MEDICAID

## 2023-02-10 VITALS
BODY MASS INDEX: 30.74 KG/M2 | HEIGHT: 69 IN | DIASTOLIC BLOOD PRESSURE: 70 MMHG | WEIGHT: 207.56 LBS | SYSTOLIC BLOOD PRESSURE: 120 MMHG

## 2023-02-10 DIAGNOSIS — Z78.9 OTHER SPECIFIED HEALTH STATUS: ICD-10-CM

## 2023-02-10 DIAGNOSIS — Z12.39 ENCOUNTER FOR OTHER SCREENING FOR MALIGNANT NEOPLASM OF BREAST: ICD-10-CM

## 2023-02-10 DIAGNOSIS — Z01.419 ENCOUNTER FOR GYNECOLOGICAL EXAMINATION (GENERAL) (ROUTINE) W/OUT ABNORMAL FINDINGS: ICD-10-CM

## 2023-02-10 PROCEDURE — 99396 PREV VISIT EST AGE 40-64: CPT

## 2023-02-10 NOTE — HISTORY OF PRESENT ILLNESS
[N] : Patient does not use contraception [Frequency: Q ___ days] : menstrual periods occur approximately every [unfilled] days [Menarche Age: ____] : age at menarche was [unfilled] [Regular Cycle Intervals] : periods have been regular [Y] : Patient is sexually active [PGxTotal] : 7 [BannerxFullTerm] : 4 [PGHxPremature] : 0 [PGHxAbortions] : 3 [[facility] CenterxLiving] : 4 [PGHxABInduced] : 0 [PGxABSpont] : 3 [PGHxEctopic] : 0 [PGHxMultBirths] : 0

## 2023-02-12 LAB — HPV HIGH+LOW RISK DNA PNL CVX: NOT DETECTED

## 2023-02-14 LAB — CYTOLOGY CVX/VAG DOC THIN PREP: NORMAL

## 2023-03-07 ENCOUNTER — RESULT CHARGE (OUTPATIENT)
Age: 43
End: 2023-03-07

## 2023-03-07 ENCOUNTER — APPOINTMENT (OUTPATIENT)
Dept: OBGYN | Facility: HOSPITAL | Age: 43
End: 2023-03-07
Payer: MEDICAID

## 2023-03-07 ENCOUNTER — OUTPATIENT (OUTPATIENT)
Dept: OUTPATIENT SERVICES | Facility: HOSPITAL | Age: 43
LOS: 1 days | End: 2023-03-07

## 2023-03-07 VITALS
BODY MASS INDEX: 30.66 KG/M2 | WEIGHT: 207 LBS | SYSTOLIC BLOOD PRESSURE: 138 MMHG | HEIGHT: 69 IN | HEART RATE: 84 BPM | TEMPERATURE: 97.6 F | DIASTOLIC BLOOD PRESSURE: 89 MMHG

## 2023-03-07 DIAGNOSIS — Z00.00 ENCOUNTER FOR GENERAL ADULT MEDICAL EXAMINATION W/OUT ABNORMAL FINDINGS: ICD-10-CM

## 2023-03-07 DIAGNOSIS — Z32.00 ENCOUNTER FOR PREGNANCY TEST, RESULT UNKNOWN: ICD-10-CM

## 2023-03-07 DIAGNOSIS — N83.202 UNSPECIFIED OVARIAN CYST, LEFT SIDE: ICD-10-CM

## 2023-03-07 PROCEDURE — 99213 OFFICE O/P EST LOW 20 MIN: CPT | Mod: GC,25

## 2023-03-07 PROCEDURE — 76830 TRANSVAGINAL US NON-OB: CPT | Mod: 26

## 2023-03-08 NOTE — PROCEDURE
[Transvaginal OB Sonogram] : Transvaginal OB Sonogram [Intrauterine Pregnancy] : intrauterine pregnancy [Yolk Sac] : yolk sac present [FreeTextEntry1] : +gestational sac w/yolk sac; LTO simple appearing cyst

## 2023-03-08 NOTE — REASON FOR VISIT
[Follow-Up] : a follow-up evaluation of [[employer] ] : provided by [employer]   [TWNoteComboBox1] : [employer]

## 2023-03-13 DIAGNOSIS — Z32.00 ENCOUNTER FOR PREGNANCY TEST, RESULT UNKNOWN: ICD-10-CM

## 2023-03-13 DIAGNOSIS — N83.202 UNSPECIFIED OVARIAN CYST, LEFT SIDE: ICD-10-CM

## (undated) DEVICE — PACK D&C

## (undated) DEVICE — DRAPE TOWEL BLUE 17" X 24"

## (undated) DEVICE — GOWN LG

## (undated) DEVICE — PREP BETADINE SPONGE STICKS

## (undated) DEVICE — VENODYNE/SCD SLEEVE CALF MEDIUM

## (undated) DEVICE — DRAPE IRRIGATION POUCH 19X23"

## (undated) DEVICE — LABELS BLANK W PEN

## (undated) DEVICE — PACK PERI GYN

## (undated) DEVICE — BASIN SET DOUBLE

## (undated) DEVICE — TUBING SUCTION NONCONDUCTIVE 6MM X 12FT

## (undated) DEVICE — POSITIONER STRAP ARMBOARD VELCRO TS-30

## (undated) DEVICE — TUBING IRR SET FOR CYSTOSCOPY 77"

## (undated) DEVICE — POSITIONER PINK PAD PIGAZZI SYSTEM

## (undated) DEVICE — DRSG TELFA 3 X 8

## (undated) DEVICE — GLV 7 PROTEXIS (CREAM) MICRO

## (undated) DEVICE — SOL IRR POUR H2O 500ML

## (undated) DEVICE — SOL IRR POUR NS 0.9% 1000ML

## (undated) DEVICE — DRAPE LIGHT HANDLE COVER (GREEN)

## (undated) DEVICE — DRSG PAD SANITARY OB

## (undated) DEVICE — PRESSURE INFUSOR BAG 1000ML

## (undated) DEVICE — VISITEC 4X4